# Patient Record
(demographics unavailable — no encounter records)

---

## 2024-11-25 NOTE — HISTORY OF PRESENT ILLNESS
[Well-balanced] : well-balanced [Normal] : Normal [No] : No cigarette smoke exposure [Water heater temperature set at <120 degrees F] : Water heater temperature set at <120 degrees F [Rear facing car seat in back seat] : Rear facing car seat in back seat [Carbon Monoxide Detectors] : Carbon monoxide detectors at home [Smoke Detectors] : Smoke detectors at home. [NO] : No

## 2024-11-25 NOTE — DEVELOPMENTAL MILESTONES
[Normal Development] : Normal Development [None] : none [FreeTextEntry1] : Prone:  Rolls over, pivots, may crawl, or creep-crawl Supine:  Lifts head,  rolls over,  squirming movementsSitting/Standing:  Sits with pelvic support,  back rounded,  leans forward on handsManipulation:  Rakes at pellet,  turns body to extend reach,  grasps and transfersSocial:  Prefers mother,  repetitive vowel sounds,  responds to emotional content of interaction,  imitates banging [Passed] : passed [No] : Not Completed.

## 2024-11-25 NOTE — PHYSICAL EXAM
[Alert] : alert [Acute Distress] : no acute distress [Normocephalic] : normocephalic [Flat Open Anterior [place]] : flat open anterior fontanelle [Red Reflex] : red reflex bilateral [PERRL] : PERRL [Normally Placed Ears] : normally placed ears [Auricles Well Formed] : auricles well formed [Clear Tympanic membranes] : clear tympanic membranes [Light reflex present] : light reflex present [Bony landmarks visible] : bony landmarks visible [Discharge] : no discharge [Nares Patent] : nares patent [Palate Intact] : palate intact [Uvula Midline] : uvula midline [Tooth Eruption] : no tooth eruption [Supple, full passive range of motion] : supple, full passive range of motion [Palpable Masses] : no palpable masses [Symmetric Chest Rise] : symmetric chest rise [Clear to Auscultation Bilaterally] : clear to auscultation bilaterally [Regular Rate and Rhythm] : regular rate and rhythm [S1, S2 present] : S1, S2 present [Murmurs] : no murmurs [+2 Femoral Pulses] : (+) 2 femoral pulses [Soft] : soft [Tender] : nontender [Distended] : nondistended [Bowel Sounds] : bowel sounds present [Hepatomegaly] : no hepatomegaly [Splenomegaly] : no splenomegaly [Central Urethral Opening] : central urethral opening [Testicles Descended] : testicles descended bilaterally [Patent] : patent [Normally Placed] : normally placed [No Abnormal Lymph Nodes Palpated] : no abnormal lymph nodes palpated [Gregg-Ortolani] : negative Gregg-Ortolani [Allis Sign] : negative Allis sign [Symmetric Buttocks Creases] : symmetric buttocks creases [Spinal Dimple] : no spinal dimple [Tuft of Hair] : no tuft of hair [Plantar Grasp] : plantar grasp reflex present [Cranial Nerves Grossly Intact] : cranial nerves grossly intact [Rash or Lesions] : no rash/lesions

## 2025-03-01 NOTE — HISTORY OF PRESENT ILLNESS
[Well-balanced] : well-balanced [Normal] : Normal [No] : No cigarette smoke exposure [Water heater temperature set at <120 degrees F] : Water heater temperature set at <120 degrees F [Rear facing car seat in  back seat] : Rear facing car seat in  back seat [Carbon Monoxide Detectors] : Carbon monoxide detectors [Smoke Detectors] : Smoke detectors [NO] : No

## 2025-03-01 NOTE — PHYSICAL EXAM

## 2025-03-01 NOTE — DEVELOPMENTAL MILESTONES
[Normal Development] : Normal Development [None] : none [FreeTextEntry1] : Sitting/Standing:  Sits alone, with back straight Locomotor:  creeps or crawls,   "Walks" with both hands held Manipulative:  Grasps with thumb and forefinger.  Pokes with forefinger.  Uses pincer movement, assisted.Cognitive:  Alert to sound of own name.  Uncovers hidden object.Social/Language:  Plays peek-a-patton,  pat-a-cake.  Waves bye-bye.  Repetitive consonant sounds.

## 2025-05-24 NOTE — HISTORY OF PRESENT ILLNESS
[Formula ___ oz/feed] : [unfilled] oz of formula per feed [Cow's milk ___ oz/feed] : [unfilled] oz of Cow's milk per feed [Fruit] : fruit [Vegetables] : vegetables [Meat] : meat [Dairy] : dairy [Finger food] : finger food [Table food] : table food [Normal] : Normal [Toothpaste] : Primary Fluoride Source: Toothpaste [Playtime] : Playtime  [No] : Not at  exposure [Water heater temperature set at <120 degrees F] : Water heater temperature set at <120 degrees F [Car seat in back seat] : Car seat in back seat [Smoke Detectors] : Smoke detectors [Carbon Monoxide Detectors] : Carbon monoxide detectors [At risk for exposure to TB] : Not at risk for exposure to Tuberculosis [LastFluoridetreatment] : n/a [NO] : No

## 2025-05-24 NOTE — PHYSICAL EXAM
[Alert] : alert [Normocephalic] : normocephalic [Closed Anterior [place]] : closed anterior fontanelle [Red Reflex] : red reflex bilateral [PERRL] : PERRL [Normally Placed Ears] : normally placed ears [Auricles Well Formed] : auricles well formed [Clear Tympanic membranes] : clear tympanic membranes [Light reflex present] : light reflex present [Bony landmarks visible] : bony landmarks visible [Discharge] : no discharge [Nares Patent] : nares patent [Palate Intact] : palate intact [Uvula Midline] : uvula midline [Tooth Eruption] : tooth eruption [Supple, full passive range of motion] : supple, full passive range of motion [Palpable Masses] : no palpable masses [Symmetric Chest Rise] : symmetric chest rise [Clear to Auscultation Bilaterally] : clear to auscultation bilaterally [Regular Rate and Rhythm] : regular rate and rhythm [S1, S2 present] : S1, S2 present [Murmurs] : no murmurs [+2 Femoral Pulses] : (+) 2 femoral pulses [Soft] : soft [Tender] : nontender [Distended] : nondistended [Bowel Sounds] : normoactive bowel sounds [Hepatomegaly] : no hepatomegaly [Splenomegaly] : no splenomegaly [Central Urethral Opening] : central urethral opening [Testicles Descended] : testicles descended bilaterally [No Abnormal Lymph Nodes Palpated] : no abnormal lymph nodes palpated [Symmetric Abduction and Rotation of Hips] : symmetric abduction and rotation of hips [Allis Sign] : negative Allis sign [Straight] : straight [Cranial Nerves Grossly Intact] : cranial nerves grossly intact [Rash or Lesions] : no rash/lesions

## 2025-06-03 NOTE — HISTORY OF PRESENT ILLNESS
[de-identified] : SMALL BUMPS BEHIND EARS FOR 2 DAYS. [FreeTextEntry6] : 1 year old infant presents with swelling behind the ears on his head noticed by mother for the past few days.No fever, cough, wheezing, vomiting. No rashes on the body.

## 2025-06-03 NOTE — PHYSICAL EXAM
[NL] : warm, clear [de-identified] : small, mobile, non tender palpable occipital lymph nodes bilaterally

## 2025-06-18 NOTE — DISCUSSION/SUMMARY
[FreeTextEntry1] : 12 MO WITH FIRST DEGREE BURN ON CHEST FROM COFFEE NO BLISTERING DISCUSSED KEEPING AREA CLEAN SILVADINE TO AREA BID  RTO IF BLISTERING OCCURS, INCREASE IN REDNESS OR CONCERNS

## 2025-06-18 NOTE — PHYSICAL EXAM
[NL] : warm, clear [de-identified] : AREA OF PINK UPPER R CHEST DOWN TO LOWER ABDOMEN; NO BLISTERING OR SKIN BREAKAGE

## 2025-06-18 NOTE — HISTORY OF PRESENT ILLNESS
[de-identified] : coffee spilled on child [FreeTextEntry6] : PER GRANDFATHER, PT GRABBED CUP OF HOT COFFEE AND IT SPILLED ONTO PT'S CHEST AND ABDOMEN. was wearing a shirt at the time coffee was warm/hot , not fresh